# Patient Record
Sex: FEMALE | Race: WHITE | NOT HISPANIC OR LATINO | Employment: UNEMPLOYED | URBAN - METROPOLITAN AREA
[De-identification: names, ages, dates, MRNs, and addresses within clinical notes are randomized per-mention and may not be internally consistent; named-entity substitution may affect disease eponyms.]

---

## 2021-12-14 ENCOUNTER — TELEPHONE (OUTPATIENT)
Dept: OBSTETRICS AND GYNECOLOGY | Facility: CLINIC | Age: 26
End: 2021-12-14

## 2021-12-14 NOTE — TELEPHONE ENCOUNTER
I scheduled the PT for Nexplanon removal and insertion. I just realized I didn't schedule for during period, should I call and reschedule? Also her insurance was updated, do we do check with insurance on Nexplanon like we do for IUD?

## 2022-01-24 ENCOUNTER — OFFICE VISIT (OUTPATIENT)
Dept: OBSTETRICS AND GYNECOLOGY | Facility: CLINIC | Age: 27
End: 2022-01-24

## 2022-01-24 VITALS
HEIGHT: 65 IN | WEIGHT: 195.4 LBS | SYSTOLIC BLOOD PRESSURE: 126 MMHG | DIASTOLIC BLOOD PRESSURE: 82 MMHG | BODY MASS INDEX: 32.55 KG/M2

## 2022-01-24 DIAGNOSIS — Z30.46 ENCOUNTER FOR REMOVAL AND REINSERTION OF NEXPLANON: ICD-10-CM

## 2022-01-24 DIAGNOSIS — Z13.9 SCREENING FOR CONDITION: Primary | ICD-10-CM

## 2022-01-24 LAB
B-HCG UR QL: NEGATIVE
BILIRUB BLD-MCNC: NEGATIVE MG/DL
CLARITY, POC: CLEAR
COLOR UR: YELLOW
EXPIRATION DATE: NORMAL
GLUCOSE UR STRIP-MCNC: NEGATIVE MG/DL
INTERNAL NEGATIVE CONTROL: NORMAL
INTERNAL POSITIVE CONTROL: NORMAL
KETONES UR QL: NEGATIVE
LEUKOCYTE EST, POC: NEGATIVE
Lab: NORMAL
NITRITE UR-MCNC: NEGATIVE MG/ML
PH UR: 5 [PH] (ref 5–8)
PROT UR STRIP-MCNC: NEGATIVE MG/DL
RBC # UR STRIP: NEGATIVE /UL
SP GR UR: 1 (ref 1–1.03)
UROBILINOGEN UR QL: NORMAL

## 2022-01-24 PROCEDURE — 81025 URINE PREGNANCY TEST: CPT | Performed by: OBSTETRICS & GYNECOLOGY

## 2022-01-24 PROCEDURE — 11983 REMOVE/INSERT DRUG IMPLANT: CPT | Performed by: OBSTETRICS & GYNECOLOGY

## 2022-01-24 PROCEDURE — 81002 URINALYSIS NONAUTO W/O SCOPE: CPT | Performed by: OBSTETRICS & GYNECOLOGY

## 2022-01-24 RX ORDER — DIMENHYDRINATE 50 MG/1
50 TABLET ORAL NIGHTLY PRN
COMMUNITY

## 2022-01-24 NOTE — PROGRESS NOTES
Procedure: Nexplanon removal & replacement    Chief Complaint: Nexplanon removal & replacement    Procedures      Pre Dx: 1) Nexplanon removal and replacement  Post Dx: 1) Nexplanon removal and replacement    The risks, benefits, and alternatives to remove the device have been discussed with the patient at length. All of her questions are answered. She is aware of the need for alternative means of contraception if desired. Verbal informed consent is obtained.    Able to easily palpate the device in the left arm. Additional imaging was not required. The device feels freely mobile and easily accessible. She was placed in the  supine position with her arm elevated at her side. The skin over this site is prepped with Betadine. 2-3 mL of 1% lidocaine with no epinephrine was injected.  Downward pressure was applied on the end of the implant nearest the axilla, and a 2-3 mm incision was made in a longitudinal direction of the arm at the tip of the implant closest to the elbow. The implant was then pushed gently toward the incision until the tip was visible. The fibrous capsule was opened with blunt dissection using a hemostat. The implant was grasp with a hemostat and was easily removed intact. It measured a  full 4 cm in length.    A replacement nexplanon was then placed per protocol without problems.    Tolerated well  No apparent complications    The skin was cleansed and dried. A Steri-Strip was applied. The arm was gently wrapped with Kerlex gauze. The patient had normal strength and sensation in her upper extremity. There was no significant bleeding. There were no complications. The patient was advised about proper care of the dressings. She was advised to call or return for complications such as fever, signs of infection, increased pain, or any other concerns.    Warning signs, limitations and expectations reviewed.     Diagnoses and all orders for this visit:    1. Screening for condition (Primary)  -     POC  Pregnancy, Urine  -     POC Urinalysis Dipstick    2. Encounter for removal and reinsertion of Nexplanon        RTO Return if symptoms worsen or fail to improve, for Annual physical.    Sher Ortiz MD    1/24/2022  12:18 EST